# Patient Record
Sex: FEMALE | Race: BLACK OR AFRICAN AMERICAN | NOT HISPANIC OR LATINO | URBAN - METROPOLITAN AREA
[De-identification: names, ages, dates, MRNs, and addresses within clinical notes are randomized per-mention and may not be internally consistent; named-entity substitution may affect disease eponyms.]

---

## 2017-09-13 ENCOUNTER — OUTPATIENT (OUTPATIENT)
Dept: OUTPATIENT SERVICES | Facility: HOSPITAL | Age: 37
LOS: 1 days | End: 2017-09-13

## 2017-09-13 VITALS
WEIGHT: 154.1 LBS | TEMPERATURE: 98 F | RESPIRATION RATE: 14 BRPM | DIASTOLIC BLOOD PRESSURE: 70 MMHG | SYSTOLIC BLOOD PRESSURE: 110 MMHG | HEIGHT: 61 IN | HEART RATE: 78 BPM

## 2017-09-13 DIAGNOSIS — D25.9 LEIOMYOMA OF UTERUS, UNSPECIFIED: ICD-10-CM

## 2017-09-13 LAB
BLD GP AB SCN SERPL QL: NEGATIVE — SIGNIFICANT CHANGE UP
BUN SERPL-MCNC: 11 MG/DL — SIGNIFICANT CHANGE UP (ref 7–23)
CALCIUM SERPL-MCNC: 9.2 MG/DL — SIGNIFICANT CHANGE UP (ref 8.4–10.5)
CHLORIDE SERPL-SCNC: 101 MMOL/L — SIGNIFICANT CHANGE UP (ref 98–107)
CO2 SERPL-SCNC: 26 MMOL/L — SIGNIFICANT CHANGE UP (ref 22–31)
CREAT SERPL-MCNC: 0.85 MG/DL — SIGNIFICANT CHANGE UP (ref 0.5–1.3)
GLUCOSE SERPL-MCNC: 81 MG/DL — SIGNIFICANT CHANGE UP (ref 70–99)
HCT VFR BLD CALC: 37.5 % — SIGNIFICANT CHANGE UP (ref 34.5–45)
HGB BLD-MCNC: 12.5 G/DL — SIGNIFICANT CHANGE UP (ref 11.5–15.5)
MCHC RBC-ENTMCNC: 32.6 PG — SIGNIFICANT CHANGE UP (ref 27–34)
MCHC RBC-ENTMCNC: 33.3 % — SIGNIFICANT CHANGE UP (ref 32–36)
MCV RBC AUTO: 97.7 FL — SIGNIFICANT CHANGE UP (ref 80–100)
NRBC # FLD: 0 — SIGNIFICANT CHANGE UP
PLATELET # BLD AUTO: 250 K/UL — SIGNIFICANT CHANGE UP (ref 150–400)
PMV BLD: 10.4 FL — SIGNIFICANT CHANGE UP (ref 7–13)
POTASSIUM SERPL-MCNC: 3.5 MMOL/L — SIGNIFICANT CHANGE UP (ref 3.5–5.3)
POTASSIUM SERPL-SCNC: 3.5 MMOL/L — SIGNIFICANT CHANGE UP (ref 3.5–5.3)
RBC # BLD: 3.84 M/UL — SIGNIFICANT CHANGE UP (ref 3.8–5.2)
RBC # FLD: 11.9 % — SIGNIFICANT CHANGE UP (ref 10.3–14.5)
RH IG SCN BLD-IMP: POSITIVE — SIGNIFICANT CHANGE UP
SODIUM SERPL-SCNC: 139 MMOL/L — SIGNIFICANT CHANGE UP (ref 135–145)
WBC # BLD: 4.19 K/UL — SIGNIFICANT CHANGE UP (ref 3.8–10.5)
WBC # FLD AUTO: 4.19 K/UL — SIGNIFICANT CHANGE UP (ref 3.8–10.5)

## 2017-09-13 RX ORDER — SODIUM CHLORIDE 9 MG/ML
1000 INJECTION, SOLUTION INTRAVENOUS
Qty: 0 | Refills: 0 | Status: DISCONTINUED | OUTPATIENT
Start: 2017-09-28 | End: 2017-09-29

## 2017-09-13 NOTE — H&P PST ADULT - ATTENDING COMMENTS
38yo with fibroid uterus for abdominal myomectomy. 36yo with fibroid uterus for abdominal myomectomy.  All risks of procedure (hemorrhage, shock, DVT, PE, hysterectomy, damage/injury to nearby structures or organs, and transfusion of blood and blood products) discussed.  Need for  delivery with future pregnancies and recommendation to wait 18months after procedure before attempting to become pregnant also discussed.  Pt also informed of possibility of infertility even after myomectomy.  Pt verbalized understanding and noted that she would like to have procedure.  Consents signed and witnessed.

## 2017-09-13 NOTE — H&P PST ADULT - HISTORY OF PRESENT ILLNESS
36 y/o female with history of uterine fibroids 38 y/o female with history of uterine fibroids presents to PAST today for presurgical evaluation.  She is scheduled for abdominal myomectomy on 9/28/17.

## 2017-09-13 NOTE — H&P PST ADULT - PROBLEM SELECTOR PLAN 1
Pt is scheduled for abdominal myomectomy on 9/28/17.  Preoperative instructions reviewed, pt verbalized understanding.  Preop Famotidine and Chlorhexidine provided.  Lab results pending

## 2017-09-28 ENCOUNTER — INPATIENT (INPATIENT)
Facility: HOSPITAL | Age: 37
LOS: 2 days | Discharge: HOME | End: 2017-10-01
Attending: OBSTETRICS & GYNECOLOGY | Admitting: OBSTETRICS & GYNECOLOGY
Payer: MEDICAID

## 2017-09-28 VITALS
SYSTOLIC BLOOD PRESSURE: 105 MMHG | HEIGHT: 61 IN | OXYGEN SATURATION: 98 % | TEMPERATURE: 99 F | HEART RATE: 89 BPM | WEIGHT: 154.1 LBS | DIASTOLIC BLOOD PRESSURE: 58 MMHG | RESPIRATION RATE: 16 BRPM

## 2017-09-28 DIAGNOSIS — D25.9 LEIOMYOMA OF UTERUS, UNSPECIFIED: ICD-10-CM

## 2017-09-28 DIAGNOSIS — Z98.890 OTHER SPECIFIED POSTPROCEDURAL STATES: ICD-10-CM

## 2017-09-28 LAB
HCG UR QL: NEGATIVE — SIGNIFICANT CHANGE UP
RH IG SCN BLD-IMP: POSITIVE — SIGNIFICANT CHANGE UP

## 2017-09-28 PROCEDURE — 88305 TISSUE EXAM BY PATHOLOGIST: CPT | Mod: 26

## 2017-09-28 RX ORDER — ONDANSETRON 8 MG/1
4 TABLET, FILM COATED ORAL EVERY 6 HOURS
Qty: 0 | Refills: 0 | Status: DISCONTINUED | OUTPATIENT
Start: 2017-09-28 | End: 2017-10-01

## 2017-09-28 RX ORDER — SIMETHICONE 80 MG/1
80 TABLET, CHEWABLE ORAL THREE TIMES A DAY
Qty: 0 | Refills: 0 | Status: DISCONTINUED | OUTPATIENT
Start: 2017-09-28 | End: 2017-10-01

## 2017-09-28 RX ORDER — SODIUM CHLORIDE 9 MG/ML
1000 INJECTION, SOLUTION INTRAVENOUS
Qty: 0 | Refills: 0 | Status: DISCONTINUED | OUTPATIENT
Start: 2017-09-28 | End: 2017-09-29

## 2017-09-28 RX ORDER — HYDROMORPHONE HYDROCHLORIDE 2 MG/ML
0.5 INJECTION INTRAMUSCULAR; INTRAVENOUS; SUBCUTANEOUS
Qty: 0 | Refills: 0 | Status: DISCONTINUED | OUTPATIENT
Start: 2017-09-28 | End: 2017-10-01

## 2017-09-28 RX ORDER — DOCUSATE SODIUM 100 MG
100 CAPSULE ORAL THREE TIMES A DAY
Qty: 0 | Refills: 0 | Status: DISCONTINUED | OUTPATIENT
Start: 2017-09-28 | End: 2017-10-01

## 2017-09-28 RX ORDER — ONDANSETRON 8 MG/1
4 TABLET, FILM COATED ORAL ONCE
Qty: 0 | Refills: 0 | Status: DISCONTINUED | OUTPATIENT
Start: 2017-09-28 | End: 2017-10-01

## 2017-09-28 RX ORDER — HYDROMORPHONE HYDROCHLORIDE 2 MG/ML
1 INJECTION INTRAMUSCULAR; INTRAVENOUS; SUBCUTANEOUS
Qty: 0 | Refills: 0 | Status: DISCONTINUED | OUTPATIENT
Start: 2017-09-28 | End: 2017-10-01

## 2017-09-28 RX ORDER — SODIUM CHLORIDE 9 MG/ML
1000 INJECTION, SOLUTION INTRAVENOUS ONCE
Qty: 0 | Refills: 0 | Status: COMPLETED | OUTPATIENT
Start: 2017-09-28 | End: 2017-09-28

## 2017-09-28 RX ORDER — NALOXONE HYDROCHLORIDE 4 MG/.1ML
0.1 SPRAY NASAL
Qty: 0 | Refills: 0 | Status: DISCONTINUED | OUTPATIENT
Start: 2017-09-28 | End: 2017-10-01

## 2017-09-28 RX ORDER — ACETAMINOPHEN 500 MG
1000 TABLET ORAL ONCE
Qty: 0 | Refills: 0 | Status: COMPLETED | OUTPATIENT
Start: 2017-09-28 | End: 2017-09-29

## 2017-09-28 RX ORDER — HEPARIN SODIUM 5000 [USP'U]/ML
5000 INJECTION INTRAVENOUS; SUBCUTANEOUS EVERY 12 HOURS
Qty: 0 | Refills: 0 | Status: DISCONTINUED | OUTPATIENT
Start: 2017-09-29 | End: 2017-10-01

## 2017-09-28 RX ADMIN — SODIUM CHLORIDE 2000 MILLILITER(S): 9 INJECTION, SOLUTION INTRAVENOUS at 21:55

## 2017-09-28 RX ADMIN — SODIUM CHLORIDE 125 MILLILITER(S): 9 INJECTION, SOLUTION INTRAVENOUS at 21:20

## 2017-09-28 RX ADMIN — SODIUM CHLORIDE 30 MILLILITER(S): 9 INJECTION, SOLUTION INTRAVENOUS at 16:00

## 2017-09-28 NOTE — BRIEF OPERATIVE NOTE - PROCEDURE
Lysis of adhesions  09/28/2017    Active  TSPIEGEL  Myomectomy  09/28/2017    Active  TSPIEGEL <<-----Click on this checkbox to enter Procedure

## 2017-09-28 NOTE — PROGRESS NOTE ADULT - SUBJECTIVE AND OBJECTIVE BOX
R2 OBGYN POST-OP CHECK    S: Patient seen and evaluated at bedside.  Pt awake and alert resting comfortaby in bed   Patient reports pain controlled with analgesia. Pt denies N/V, SOB, CP, palpitations, fever/chills. Has not had anyting to eat but is desiring food.  Not OOB yet.    O:   T(C): 36.7 (09-28-17 @ 23:15), Max: 36.7 (09-28-17 @ 23:15)  HR: 83 (09-28-17 @ 23:15) (64 - 83)  BP: 113/67 (09-28-17 @ 23:15) (107/65 - 117/65)  RR: 20 (09-28-17 @ 23:15) (10 - 20)  SpO2: 96% (09-28-17 @ 23:15) (93% - 100%)  Wt(kg): --  I&O's Summary    28 Sep 2017 07:01  -  28 Sep 2017 23:47  --------------------------------------------------------  IN: 1275 mL / OUT: 65 mL / NET: 1210 mL        Gen: Resting comftorably in bed, NAD  CV: S1S2, RRR  Lungs: CTA B/L  Abd: soft, appropriately tender, occassional BS x 4 quadrants.    Inc: Clean/dry/intact w/ bandage in place  Ext: SCD's in place and functional, non-tender b/l, no edema

## 2017-09-29 LAB
HCT VFR BLD CALC: 29.9 % — LOW (ref 34.5–45)
HGB BLD-MCNC: 10.3 G/DL — LOW (ref 11.5–15.5)
MCHC RBC-ENTMCNC: 32.6 PG — SIGNIFICANT CHANGE UP (ref 27–34)
MCHC RBC-ENTMCNC: 34.4 % — SIGNIFICANT CHANGE UP (ref 32–36)
MCV RBC AUTO: 94.6 FL — SIGNIFICANT CHANGE UP (ref 80–100)
NRBC # FLD: 0 — SIGNIFICANT CHANGE UP
PLATELET # BLD AUTO: 186 K/UL — SIGNIFICANT CHANGE UP (ref 150–400)
PMV BLD: 9.4 FL — SIGNIFICANT CHANGE UP (ref 7–13)
RBC # BLD: 3.16 M/UL — LOW (ref 3.8–5.2)
RBC # FLD: 11.6 % — SIGNIFICANT CHANGE UP (ref 10.3–14.5)
WBC # BLD: 12.53 K/UL — HIGH (ref 3.8–10.5)
WBC # FLD AUTO: 12.53 K/UL — HIGH (ref 3.8–10.5)

## 2017-09-29 RX ORDER — ACETAMINOPHEN 500 MG
650 TABLET ORAL EVERY 6 HOURS
Qty: 0 | Refills: 0 | Status: DISCONTINUED | OUTPATIENT
Start: 2017-09-29 | End: 2017-10-01

## 2017-09-29 RX ORDER — OXYCODONE HYDROCHLORIDE 5 MG/1
5 TABLET ORAL EVERY 4 HOURS
Qty: 0 | Refills: 0 | Status: DISCONTINUED | OUTPATIENT
Start: 2017-09-29 | End: 2017-10-01

## 2017-09-29 RX ORDER — SODIUM CHLORIDE 9 MG/ML
3 INJECTION INTRAMUSCULAR; INTRAVENOUS; SUBCUTANEOUS EVERY 8 HOURS
Qty: 0 | Refills: 0 | Status: DISCONTINUED | OUTPATIENT
Start: 2017-09-29 | End: 2017-10-01

## 2017-09-29 RX ORDER — IBUPROFEN 200 MG
600 TABLET ORAL EVERY 6 HOURS
Qty: 0 | Refills: 0 | Status: DISCONTINUED | OUTPATIENT
Start: 2017-09-29 | End: 2017-10-01

## 2017-09-29 RX ORDER — INFLUENZA VIRUS VACCINE 15; 15; 15; 15 UG/.5ML; UG/.5ML; UG/.5ML; UG/.5ML
0.5 SUSPENSION INTRAMUSCULAR ONCE
Qty: 0 | Refills: 0 | Status: DISCONTINUED | OUTPATIENT
Start: 2017-09-29 | End: 2017-10-01

## 2017-09-29 RX ADMIN — HEPARIN SODIUM 5000 UNIT(S): 5000 INJECTION INTRAVENOUS; SUBCUTANEOUS at 04:59

## 2017-09-29 RX ADMIN — Medication 1000 MILLIGRAM(S): at 02:58

## 2017-09-29 RX ADMIN — SODIUM CHLORIDE 3 MILLILITER(S): 9 INJECTION INTRAMUSCULAR; INTRAVENOUS; SUBCUTANEOUS at 21:14

## 2017-09-29 RX ADMIN — SODIUM CHLORIDE 3 MILLILITER(S): 9 INJECTION INTRAMUSCULAR; INTRAVENOUS; SUBCUTANEOUS at 14:18

## 2017-09-29 RX ADMIN — OXYCODONE HYDROCHLORIDE 5 MILLIGRAM(S): 5 TABLET ORAL at 13:11

## 2017-09-29 RX ADMIN — OXYCODONE HYDROCHLORIDE 5 MILLIGRAM(S): 5 TABLET ORAL at 20:52

## 2017-09-29 RX ADMIN — Medication 600 MILLIGRAM(S): at 18:45

## 2017-09-29 RX ADMIN — Medication 400 MILLIGRAM(S): at 02:21

## 2017-09-29 RX ADMIN — SODIUM CHLORIDE 125 MILLILITER(S): 9 INJECTION, SOLUTION INTRAVENOUS at 08:49

## 2017-09-29 RX ADMIN — Medication 100 MILLIGRAM(S): at 17:03

## 2017-09-29 RX ADMIN — HYDROMORPHONE HYDROCHLORIDE 0.5 MILLIGRAM(S): 2 INJECTION INTRAMUSCULAR; INTRAVENOUS; SUBCUTANEOUS at 05:30

## 2017-09-29 RX ADMIN — SODIUM CHLORIDE 125 MILLILITER(S): 9 INJECTION, SOLUTION INTRAVENOUS at 02:03

## 2017-09-29 RX ADMIN — Medication 600 MILLIGRAM(S): at 17:03

## 2017-09-29 RX ADMIN — OXYCODONE HYDROCHLORIDE 5 MILLIGRAM(S): 5 TABLET ORAL at 14:00

## 2017-09-29 RX ADMIN — Medication 100 MILLIGRAM(S): at 23:24

## 2017-09-29 RX ADMIN — HYDROMORPHONE HYDROCHLORIDE 0.5 MILLIGRAM(S): 2 INJECTION INTRAMUSCULAR; INTRAVENOUS; SUBCUTANEOUS at 05:07

## 2017-09-29 RX ADMIN — Medication 100 MILLIGRAM(S): at 08:49

## 2017-09-29 RX ADMIN — HEPARIN SODIUM 5000 UNIT(S): 5000 INJECTION INTRAVENOUS; SUBCUTANEOUS at 17:03

## 2017-09-29 RX ADMIN — OXYCODONE HYDROCHLORIDE 5 MILLIGRAM(S): 5 TABLET ORAL at 21:50

## 2017-09-29 NOTE — DISCHARGE NOTE ADULT - CONDITIONS AT DISCHARGE
Pt ambulating and voiding without difficulty. Pt is A+OX3. Pt's VSS. Pt received oral pain meds with relief. Pt tolerates diet. Pt verbalized having a had BM on Oct. 1, 2017.

## 2017-09-29 NOTE — DISCHARGE NOTE ADULT - PATIENT PORTAL LINK FT
“You can access the [employer] Patient Portal, offered by [facility], by registering with the following website: [email or web address]”

## 2017-09-29 NOTE — DISCHARGE NOTE ADULT - INSTRUCTIONS
Advance as tolerated Notify MD if experiencing fever, nausea, vomiting, increased pain , bleeding at incision site, increased swelling or redness at incision site.

## 2017-09-29 NOTE — DISCHARGE NOTE ADULT - CARE PLAN
Principal Discharge DX:	Uterine leiomyoma, unspecified location  Goal:	Continuation of activities of daily living  Instructions for follow-up, activity and diet:	Advance diet as tolerated. No heavy lifting, no driving, no strenuous activity for 6-8 weeks. Nothing per vagina for 2 weeks or until cleared by GYN.

## 2017-09-29 NOTE — DISCHARGE NOTE ADULT - MEDICATION SUMMARY - MEDICATIONS TO TAKE
I will START or STAY ON the medications listed below when I get home from the hospital:    oxyCODONE 5 mg oral tablet  -- 1 tab(s) by mouth every 4 hours, As needed, Severe Pain (7 - 10) MDD:4  -- Indication: For Pain    ibuprofen 600 mg oral tablet  -- 1 tab(s) by mouth every 6 hours, As needed, Moderate Pain  -- Indication: For Pain    acetaminophen 325 mg oral tablet  -- 2 tab(s) by mouth every 6 hours, As needed, Mild Pain (1 - 3)  -- Indication: For Pain    acetaminophen 650 mg oral tablet, extended release  -- 1 tab(s) by mouth every 8 hours, As Needed   -- This product contains acetaminophen.  Do not use  with any other product containing acetaminophen to prevent possible liver damage.    -- Indication: For Pain     mg oral tablet  -- 1 tab(s) by mouth every 6 hours, As Needed   -- Do not take this drug if you are pregnant.  It is very important that you take or use this exactly as directed.  Do not skip doses or discontinue unless directed by your doctor.  May cause drowsiness or dizziness.  Obtain medical advice before taking any non-prescription drugs as some may affect the action of this medication.  Take with food or milk.    -- Indication: For Pain    Luden's Honey Lemon Throat Drops 1 mg mucous membrane lozenge  -- 2 lozenge mucous membrane every 2 hours, As Needed   -- Check with your doctor before becoming pregnant.  Do not chew, break, or crush.    -- Indication: For Pain    ferrous sulfate 325 mg (65 mg elemental iron) oral tablet  -- 1 tab(s) by mouth once a day x 30 days   -- Indication: For anemia    docusate sodium 100 mg oral capsule  -- 1 cap(s) by mouth 3 times a day  -- Indication: For constipation    simethicone 80 mg oral tablet, chewable  -- 1 tab(s) by mouth 3 times a day, As needed, Gas  -- Indication: For gas pain I will START or STAY ON the medications listed below when I get home from the hospital:    oxyCODONE 5 mg oral tablet  -- 1 tab(s) by mouth every 4 hours, As needed, Severe Pain (7 - 10) MDD:4  -- Indication: For Pain    ibuprofen 600 mg oral tablet  -- 1 tab(s) by mouth every 6 hours, As needed, Moderate Pain  -- Indication: For Pain    acetaminophen 325 mg oral tablet  -- 2 tab(s) by mouth every 6 hours, As needed, Mild Pain (1 - 3)  -- Indication: For Pain    acetaminophen 650 mg oral tablet, extended release  -- 1 tab(s) by mouth every 8 hours, As Needed   -- This product contains acetaminophen.  Do not use  with any other product containing acetaminophen to prevent possible liver damage.    -- Indication: For Pain     mg oral tablet  -- 1 tab(s) by mouth every 6 hours, As Needed   -- Do not take this drug if you are pregnant.  It is very important that you take or use this exactly as directed.  Do not skip doses or discontinue unless directed by your doctor.  May cause drowsiness or dizziness.  Obtain medical advice before taking any non-prescription drugs as some may affect the action of this medication.  Take with food or milk.    -- Indication: For Pain    Luden's Honey Lemon Throat Drops 1 mg mucous membrane lozenge  -- 2 lozenge mucous membrane every 2 hours, As Needed   -- Check with your doctor before becoming pregnant.  Do not chew, break, or crush.    -- Indication: For Pain    ferrous sulfate 325 mg (65 mg elemental iron) oral tablet  -- 1 tab(s) by mouth once a day x 30 days   -- Indication: For anemia    docusate sodium 100 mg oral capsule  -- 1 cap(s) by mouth 3 times a day  -- Indication: For constipation    bisacodyl 10 mg rectal suppository  -- 1 suppository(ies) rectally once a day, As Needed  for constipation   -- For rectal use only.  Keep in refrigerator.  Do not freeze.    -- Indication: For constipation    simethicone 80 mg oral tablet, chewable  -- 1 tab(s) by mouth 3 times a day, As needed, Gas  -- Indication: For gas pain

## 2017-09-29 NOTE — PROGRESS NOTE ADULT - SUBJECTIVE AND OBJECTIVE BOX
INTERVAL HPI/OVERNIGHT EVENTS: Pt seen and examined at bedside. No issues overnight.  Has not ambulated or passed gas, pain controlled with analgesia, andrea catheter in place  SHe denies nausea/vomiting/fever/chills/chest pain/SOB/dizziness.    MEDICATIONS  (STANDING):  lactated ringers. 1000 milliLiter(s) (30 mL/Hr) IV Continuous <Continuous>  lactated ringers. 1000 milliLiter(s) (125 mL/Hr) IV Continuous <Continuous>  docusate sodium 100 milliGRAM(s) Oral three times a day  lactated ringers. 1000 milliLiter(s) (125 mL/Hr) IV Continuous <Continuous>  heparin  Injectable 5000 Unit(s) SubCutaneous every 12 hours  influenza   Vaccine 0.5 milliLiter(s) IntraMuscular once    MEDICATIONS  (PRN):  simethicone 80 milliGRAM(s) Chew three times a day PRN Gas  HYDROmorphone  Injectable 1 milliGRAM(s) IV Push every 10 minutes PRN Severe Pain (7 - 10)  HYDROmorphone  Injectable 0.5 milliGRAM(s) IV Push every 10 minutes PRN Moderate Pain (4 - 6)  ondansetron Injectable 4 milliGRAM(s) IV Push once PRN Nausea and/or Vomiting  promethazine IVPB 6.25 milliGRAM(s) IV Intermittent once PRN Nausea and/or Vomiting  naloxone Injectable 0.1 milliGRAM(s) IV Push every 3 minutes PRN For ANY of the following changes in patient status:  A. RR LESS THAN 10 breaths per minute, B. Oxygen saturation LESS THAN 90%, C. Sedation score of 6  ondansetron Injectable 4 milliGRAM(s) IV Push every 6 hours PRN Nausea      12 point ROS negative except as outlined above    Vital Signs Last 24 Hrs  T(C): 36.7 (29 Sep 2017 04:57), Max: 37.1 (28 Sep 2017 15:38)  T(F): 98 (29 Sep 2017 04:57), Max: 98.8 (28 Sep 2017 15:38)  HR: 72 (29 Sep 2017 05:20) (64 - 89)  BP: 107/64 (29 Sep 2017 05:20) (105/58 - 117/65)  BP(mean): --  RR: 15 (29 Sep 2017 05:20) (10 - 20)  SpO2: 96% (29 Sep 2017 05:20) (93% - 100%)    I&O's Summary    28 Sep 2017 07:01  -  29 Sep 2017 06:54  --------------------------------------------------------  IN: 2025 mL / OUT: 290 mL / NET: 1735 mL          PHYSICAL EXAM:    GA: NAD, A+0 x 3  CV: RRR  Pulm: CTA BL  Abd: ( + ) BS, soft, nontender, nondistended, no rebound or guarding,   Incision: clean, dry and intact; steri-strips in place  Extremities: NTBL        LABS:                          10.3   12.53 )-----------( 186      ( 29 Sep 2017 04:50 )             29.9   baso x      eos x      imm gran x      lymph x      mono x      poly x                      RADIOLOGY & ADDITIONAL TESTS:

## 2017-09-29 NOTE — DISCHARGE NOTE ADULT - PLAN OF CARE
Continuation of activities of daily living Advance diet as tolerated. No heavy lifting, no driving, no strenuous activity for 6-8 weeks. Nothing per vagina for 2 weeks or until cleared by GYN.

## 2017-09-29 NOTE — DISCHARGE NOTE ADULT - CARE PROVIDER_API CALL
Lorraine Reyes), Obstetrics  Gynecology  [address] [place] [address]  Phone: [phone]  Fax: [phone]

## 2017-09-30 LAB
BUN SERPL-MCNC: 9 MG/DL — SIGNIFICANT CHANGE UP (ref 7–23)
CALCIUM SERPL-MCNC: 8 MG/DL — LOW (ref 8.4–10.5)
CHLORIDE SERPL-SCNC: 103 MMOL/L — SIGNIFICANT CHANGE UP (ref 98–107)
CO2 SERPL-SCNC: 27 MMOL/L — SIGNIFICANT CHANGE UP (ref 22–31)
CREAT SERPL-MCNC: 0.7 MG/DL — SIGNIFICANT CHANGE UP (ref 0.5–1.3)
GLUCOSE SERPL-MCNC: 98 MG/DL — SIGNIFICANT CHANGE UP (ref 70–99)
HCT VFR BLD CALC: 27.7 % — LOW (ref 34.5–45)
HGB BLD-MCNC: 9.3 G/DL — LOW (ref 11.5–15.5)
MCHC RBC-ENTMCNC: 32.4 PG — SIGNIFICANT CHANGE UP (ref 27–34)
MCHC RBC-ENTMCNC: 33.6 % — SIGNIFICANT CHANGE UP (ref 32–36)
MCV RBC AUTO: 96.5 FL — SIGNIFICANT CHANGE UP (ref 80–100)
NRBC # FLD: 0 — SIGNIFICANT CHANGE UP
PLATELET # BLD AUTO: 176 K/UL — SIGNIFICANT CHANGE UP (ref 150–400)
PMV BLD: 9.9 FL — SIGNIFICANT CHANGE UP (ref 7–13)
POTASSIUM SERPL-MCNC: 4.1 MMOL/L — SIGNIFICANT CHANGE UP (ref 3.5–5.3)
POTASSIUM SERPL-SCNC: 4.1 MMOL/L — SIGNIFICANT CHANGE UP (ref 3.5–5.3)
RBC # BLD: 2.87 M/UL — LOW (ref 3.8–5.2)
RBC # FLD: 12.1 % — SIGNIFICANT CHANGE UP (ref 10.3–14.5)
SODIUM SERPL-SCNC: 141 MMOL/L — SIGNIFICANT CHANGE UP (ref 135–145)
WBC # BLD: 8.02 K/UL — SIGNIFICANT CHANGE UP (ref 3.8–10.5)
WBC # FLD AUTO: 8.02 K/UL — SIGNIFICANT CHANGE UP (ref 3.8–10.5)

## 2017-09-30 RX ORDER — BENZOCAINE AND MENTHOL 5; 1 G/100ML; G/100ML
1 LIQUID ORAL
Qty: 0 | Refills: 0 | Status: DISCONTINUED | OUTPATIENT
Start: 2017-09-30 | End: 2017-10-01

## 2017-09-30 RX ORDER — FERROUS SULFATE 325(65) MG
325 TABLET ORAL
Qty: 0 | Refills: 0 | Status: DISCONTINUED | OUTPATIENT
Start: 2017-09-30 | End: 2017-10-01

## 2017-09-30 RX ADMIN — Medication 600 MILLIGRAM(S): at 09:30

## 2017-09-30 RX ADMIN — SODIUM CHLORIDE 3 MILLILITER(S): 9 INJECTION INTRAMUSCULAR; INTRAVENOUS; SUBCUTANEOUS at 21:21

## 2017-09-30 RX ADMIN — OXYCODONE HYDROCHLORIDE 5 MILLIGRAM(S): 5 TABLET ORAL at 20:17

## 2017-09-30 RX ADMIN — OXYCODONE HYDROCHLORIDE 5 MILLIGRAM(S): 5 TABLET ORAL at 21:15

## 2017-09-30 RX ADMIN — OXYCODONE HYDROCHLORIDE 5 MILLIGRAM(S): 5 TABLET ORAL at 16:36

## 2017-09-30 RX ADMIN — BENZOCAINE AND MENTHOL 1 LOZENGE: 5; 1 LIQUID ORAL at 08:47

## 2017-09-30 RX ADMIN — BENZOCAINE AND MENTHOL 1 LOZENGE: 5; 1 LIQUID ORAL at 12:23

## 2017-09-30 RX ADMIN — OXYCODONE HYDROCHLORIDE 5 MILLIGRAM(S): 5 TABLET ORAL at 01:41

## 2017-09-30 RX ADMIN — Medication 325 MILLIGRAM(S): at 08:46

## 2017-09-30 RX ADMIN — OXYCODONE HYDROCHLORIDE 5 MILLIGRAM(S): 5 TABLET ORAL at 02:30

## 2017-09-30 RX ADMIN — Medication 100 MILLIGRAM(S): at 08:47

## 2017-09-30 RX ADMIN — SODIUM CHLORIDE 3 MILLILITER(S): 9 INJECTION INTRAMUSCULAR; INTRAVENOUS; SUBCUTANEOUS at 13:16

## 2017-09-30 RX ADMIN — SIMETHICONE 80 MILLIGRAM(S): 80 TABLET, CHEWABLE ORAL at 01:47

## 2017-09-30 RX ADMIN — OXYCODONE HYDROCHLORIDE 5 MILLIGRAM(S): 5 TABLET ORAL at 07:00

## 2017-09-30 RX ADMIN — SODIUM CHLORIDE 3 MILLILITER(S): 9 INJECTION INTRAMUSCULAR; INTRAVENOUS; SUBCUTANEOUS at 06:07

## 2017-09-30 RX ADMIN — Medication 100 MILLIGRAM(S): at 16:08

## 2017-09-30 RX ADMIN — HEPARIN SODIUM 5000 UNIT(S): 5000 INJECTION INTRAVENOUS; SUBCUTANEOUS at 16:08

## 2017-09-30 RX ADMIN — Medication 600 MILLIGRAM(S): at 23:59

## 2017-09-30 RX ADMIN — Medication 325 MILLIGRAM(S): at 20:02

## 2017-09-30 RX ADMIN — HEPARIN SODIUM 5000 UNIT(S): 5000 INJECTION INTRAVENOUS; SUBCUTANEOUS at 06:04

## 2017-09-30 RX ADMIN — OXYCODONE HYDROCHLORIDE 5 MILLIGRAM(S): 5 TABLET ORAL at 06:03

## 2017-09-30 RX ADMIN — Medication 600 MILLIGRAM(S): at 08:46

## 2017-09-30 RX ADMIN — BENZOCAINE AND MENTHOL 1 LOZENGE: 5; 1 LIQUID ORAL at 20:01

## 2017-09-30 RX ADMIN — SIMETHICONE 80 MILLIGRAM(S): 80 TABLET, CHEWABLE ORAL at 12:27

## 2017-09-30 RX ADMIN — Medication 100 MILLIGRAM(S): at 23:07

## 2017-09-30 RX ADMIN — OXYCODONE HYDROCHLORIDE 5 MILLIGRAM(S): 5 TABLET ORAL at 16:08

## 2017-09-30 RX ADMIN — Medication 600 MILLIGRAM(S): at 23:08

## 2017-09-30 RX ADMIN — BENZOCAINE AND MENTHOL 1 LOZENGE: 5; 1 LIQUID ORAL at 16:08

## 2017-09-30 RX ADMIN — SIMETHICONE 80 MILLIGRAM(S): 80 TABLET, CHEWABLE ORAL at 06:07

## 2017-09-30 NOTE — PROGRESS NOTE ADULT - SUBJECTIVE AND OBJECTIVE BOX
POD# 2  HD#3    Patient seen and examined at bedside. No acute overnight events. No acute complaints, pain partially controlled with oral pain medications.  Patient is ambulating. She is not yet passing flatus. Voiding spontaneously and tolerating regular diet. Denies CP, SOB, N/V, fevers, and chills. Patient reports numbness on the  lateral aspect of her right thigh. Denies leg pain, numbness or tingling in her feet. Patient is able to stand and bear weight in R leg.      Vital Signs Last 24 Hours  T(C): 37.1 (09-30-17 @ 06:17), Max: 37.1 (09-29-17 @ 18:12)  HR: 89 (09-30-17 @ 06:17) (72 - 98)  BP: 107/63 (09-30-17 @ 06:17) (98/54 - 107/63)  RR: 17 (09-30-17 @ 06:17) (16 - 17)  SpO2: 100% (09-30-17 @ 06:17) (96% - 100%)    I&O's Summary    28 Sep 2017 07:01  -  29 Sep 2017 07:00  --------------------------------------------------------  IN: 2525 mL / OUT: 1540 mL / NET: 985 mL    29 Sep 2017 07:01  -  30 Sep 2017 06:53  --------------------------------------------------------  IN: 0 mL / OUT: 2450 mL / NET: -2450 mL        Physical Exam:  General: NAD  CV: RRR  Lungs: CTAB  Abdomen: Soft, non-tender, non-distended, +BS  Incision: C/D/I  Ext: No pain or swelling, No skin changes. Normal sensation in left leg and medial aspect of right thigh. Normal ROM of bilateral legs.     Labs:                        9.3    8.02  )-----------( 176      ( 30 Sep 2017 05:03 )             27.7   baso x      eos x      imm gran x      lymph x      mono x      poly x                            10.3   12.53 )-----------( 186      ( 29 Sep 2017 04:50 )             29.9   baso x      eos x      imm gran x      lymph x      mono x      poly x          MEDICATIONS  (STANDING):  docusate sodium 100 milliGRAM(s) Oral three times a day  heparin  Injectable 5000 Unit(s) SubCutaneous every 12 hours  influenza   Vaccine 0.5 milliLiter(s) IntraMuscular once  sodium chloride 0.9% lock flush 3 milliLiter(s) IV Push every 8 hours  sodium chloride 0.9% lock flush 3 milliLiter(s) IV Push every 8 hours  ferrous    sulfate 325 milliGRAM(s) Oral two times a day with meals    MEDICATIONS  (PRN):  simethicone 80 milliGRAM(s) Chew three times a day PRN Gas  HYDROmorphone  Injectable 1 milliGRAM(s) IV Push every 10 minutes PRN Severe Pain (7 - 10)  HYDROmorphone  Injectable 0.5 milliGRAM(s) IV Push every 10 minutes PRN Moderate Pain (4 - 6)  ondansetron Injectable 4 milliGRAM(s) IV Push once PRN Nausea and/or Vomiting  promethazine IVPB 6.25 milliGRAM(s) IV Intermittent once PRN Nausea and/or Vomiting  naloxone Injectable 0.1 milliGRAM(s) IV Push every 3 minutes PRN For ANY of the following changes in patient status:  A. RR LESS THAN 10 breaths per minute, B. Oxygen saturation LESS THAN 90%, C. Sedation score of 6  ondansetron Injectable 4 milliGRAM(s) IV Push every 6 hours PRN Nausea  ibuprofen  Tablet 600 milliGRAM(s) Oral every 6 hours PRN Moderate Pain  acetaminophen   Tablet. 650 milliGRAM(s) Oral every 6 hours PRN Mild Pain (1 - 3)  oxyCODONE    IR 5 milliGRAM(s) Oral every 4 hours PRN Severe Pain (7 - 10)  benzocaine 15 mG/menthol 3.6 mG Lozenge 1 Lozenge Oral every 2 hours PRN Sore Throat

## 2017-10-01 VITALS
DIASTOLIC BLOOD PRESSURE: 52 MMHG | HEART RATE: 79 BPM | TEMPERATURE: 99 F | RESPIRATION RATE: 18 BRPM | SYSTOLIC BLOOD PRESSURE: 94 MMHG | OXYGEN SATURATION: 97 %

## 2017-10-01 RX ORDER — FERROUS SULFATE 325(65) MG
1 TABLET ORAL
Qty: 14 | Refills: 0 | OUTPATIENT
Start: 2017-10-01 | End: 2017-10-31

## 2017-10-01 RX ORDER — SIMETHICONE 80 MG/1
1 TABLET, CHEWABLE ORAL
Qty: 0 | Refills: 0 | COMMUNITY
Start: 2017-10-01

## 2017-10-01 RX ORDER — DOCUSATE SODIUM 100 MG
1 CAPSULE ORAL
Qty: 0 | Refills: 0 | COMMUNITY
Start: 2017-10-01

## 2017-10-01 RX ORDER — IBUPROFEN 200 MG
1 TABLET ORAL
Qty: 0 | Refills: 0 | COMMUNITY
Start: 2017-10-01

## 2017-10-01 RX ORDER — ACETAMINOPHEN 500 MG
2 TABLET ORAL
Qty: 0 | Refills: 0 | COMMUNITY
Start: 2017-10-01

## 2017-10-01 RX ORDER — ACETAMINOPHEN 500 MG
1 TABLET ORAL
Qty: 30 | Refills: 0 | OUTPATIENT
Start: 2017-10-01 | End: 2017-10-04

## 2017-10-01 RX ORDER — IBUPROFEN 200 MG
1 TABLET ORAL
Qty: 30 | Refills: 0 | OUTPATIENT
Start: 2017-10-01

## 2017-10-01 RX ORDER — OXYCODONE HYDROCHLORIDE 5 MG/1
1 TABLET ORAL
Qty: 20 | Refills: 0 | OUTPATIENT
Start: 2017-10-01

## 2017-10-01 RX ADMIN — SODIUM CHLORIDE 3 MILLILITER(S): 9 INJECTION INTRAMUSCULAR; INTRAVENOUS; SUBCUTANEOUS at 05:32

## 2017-10-01 RX ADMIN — SIMETHICONE 80 MILLIGRAM(S): 80 TABLET, CHEWABLE ORAL at 02:32

## 2017-10-01 RX ADMIN — HEPARIN SODIUM 5000 UNIT(S): 5000 INJECTION INTRAVENOUS; SUBCUTANEOUS at 06:16

## 2017-10-01 RX ADMIN — OXYCODONE HYDROCHLORIDE 5 MILLIGRAM(S): 5 TABLET ORAL at 03:12

## 2017-10-01 RX ADMIN — OXYCODONE HYDROCHLORIDE 5 MILLIGRAM(S): 5 TABLET ORAL at 02:32

## 2017-10-01 RX ADMIN — SIMETHICONE 80 MILLIGRAM(S): 80 TABLET, CHEWABLE ORAL at 13:58

## 2017-10-01 RX ADMIN — BENZOCAINE AND MENTHOL 1 LOZENGE: 5; 1 LIQUID ORAL at 08:12

## 2017-10-01 RX ADMIN — Medication 325 MILLIGRAM(S): at 08:12

## 2017-10-01 RX ADMIN — Medication 600 MILLIGRAM(S): at 07:03

## 2017-10-01 RX ADMIN — Medication 600 MILLIGRAM(S): at 06:16

## 2017-10-01 RX ADMIN — Medication 100 MILLIGRAM(S): at 08:12

## 2017-10-01 RX ADMIN — OXYCODONE HYDROCHLORIDE 5 MILLIGRAM(S): 5 TABLET ORAL at 13:58

## 2017-10-01 NOTE — PROGRESS NOTE ADULT - ASSESSMENT
37 yof s/p abdmonimal myomectomy, POD1, stable
38yo F s/p abdominal myomectomy, JUAN, Tap block, doing well post op
A/P: 37y Female s/p abdominal myomectomy, JUAN, Tap Block
36yo F POD 2 s/p abdominal myomectomy

## 2017-10-01 NOTE — PROGRESS NOTE ADULT - PROBLEM SELECTOR PLAN 1
Neuro: Continue po pain meds  CV: Hemodynamically stable  Pulm: Saturating well on room air, encourage oob/amb  GI: Advance as tolerated  : UOP adequate, voiding freely  Heme: c/w HSQ and SCDs for DVT ppx  Dispo: Continue routine post-op care    Jackie Villeda, PGY-2
1. Neuro: PCA for pain  2. CV: Hemodynamically stable.  Monitor VS. CBC in AM.   3. Pulm: Saturating well on room air.  Encourage OOB and incentive spirometer use.   4. GI: Advance to regular diet. Anti-emetics PRN.  5. : Ignacio to gravity.   6. Electrolytes: LR@125cc/hr. BMP in AM.   7. DVT ppx w/ SCD's while in bed. Early ambulation, initially with assistance then as tolerated.  Continue HSQ   8. Discharge from PACU when criteria met.     Isha Griggs PGY-2
Neuro: Continue po pain meds  CV: Hemodynamically stable  Pulm: Saturating well on room air, encourage oob/amb  GI: Advance as tolerated  : UOP adequate, voiding freely  Heme: c/w HSQ and SCDs for DVT ppx  Dispo: Continue routine post-op care    Jackie Villeda, PGY-2
neuro: pain c/w iv analgesia, advance to po when patient tolerates regular diet  CV hemodynamically stable no acute issues  pulm increase is use increase oob  gi reg diet  gu monitor uop, adequate, andrea removed, dtv#3 pm  heme dvt ppx: scds, hsq, am cbc pending  Micha-Peri R3

## 2017-10-01 NOTE — PHYSICAL THERAPY INITIAL EVALUATION ADULT - ADDITIONAL COMMENTS
Pt reports that she lives in a private house with mother with a flight of stairs to negotiate to bedroom. Prior  to hospital admission pt was completely independent and used no assistive device with ambulation. Pt denies any recent falls.    Pt left comfortable in chair, NAD, all lines intact, all precautions maintained, with call bell in reach, and RN aware of PT session.

## 2017-10-01 NOTE — PROGRESS NOTE ADULT - SUBJECTIVE AND OBJECTIVE BOX
POD# 3  HD# 4    Patient seen and examined at bedside. No acute overnight events. No acute complaints, pain well controlled. Patient is ambulating. She is passing flatus. Voiding spontaneously and tolerating regular diet. Denies CP, SOB, N/V, fevers, and chills.    Vital Signs Last 24 Hours  T(C): 36.9 (10-01-17 @ 05:31), Max: 37.2 (09-30-17 @ 14:03)  HR: 71 (10-01-17 @ 05:31) (71 - 89)  BP: 95/56 (10-01-17 @ 05:31) (94/50 - 106/66)  RR: 18 (10-01-17 @ 05:31) (17 - 19)  SpO2: 98% (10-01-17 @ 05:31) (96% - 100%)    I&O's Summary    30 Sep 2017 07:01  -  01 Oct 2017 07:00  --------------------------------------------------------  IN: 0 mL / OUT: 700 mL / NET: -700 mL        Physical Exam:  General: NAD  CV: RRR  Lungs: CTAB  Abdomen: Soft, non-tender, non-distended, +BS  Incision: C/D/I  Ext: No pain or swelling    Labs:                        9.3    8.02  )-----------( 176      ( 30 Sep 2017 05:03 )             27.7   baso x      eos x      imm gran x      lymph x      mono x      poly x                            10.3   12.53 )-----------( 186      ( 29 Sep 2017 04:50 )             29.9   baso x      eos x      imm gran x      lymph x      mono x      poly x          MEDICATIONS  (STANDING):  docusate sodium 100 milliGRAM(s) Oral three times a day  heparin  Injectable 5000 Unit(s) SubCutaneous every 12 hours  influenza   Vaccine 0.5 milliLiter(s) IntraMuscular once  sodium chloride 0.9% lock flush 3 milliLiter(s) IV Push every 8 hours  sodium chloride 0.9% lock flush 3 milliLiter(s) IV Push every 8 hours  ferrous    sulfate 325 milliGRAM(s) Oral two times a day with meals  benzocaine 15 mG/menthol 3.6 mG Lozenge 1 Lozenge Oral five times a day    MEDICATIONS  (PRN):  simethicone 80 milliGRAM(s) Chew three times a day PRN Gas  HYDROmorphone  Injectable 1 milliGRAM(s) IV Push every 10 minutes PRN Severe Pain (7 - 10)  HYDROmorphone  Injectable 0.5 milliGRAM(s) IV Push every 10 minutes PRN Moderate Pain (4 - 6)  ondansetron Injectable 4 milliGRAM(s) IV Push once PRN Nausea and/or Vomiting  promethazine IVPB 6.25 milliGRAM(s) IV Intermittent once PRN Nausea and/or Vomiting  naloxone Injectable 0.1 milliGRAM(s) IV Push every 3 minutes PRN For ANY of the following changes in patient status:  A. RR LESS THAN 10 breaths per minute, B. Oxygen saturation LESS THAN 90%, C. Sedation score of 6  ondansetron Injectable 4 milliGRAM(s) IV Push every 6 hours PRN Nausea  ibuprofen  Tablet 600 milliGRAM(s) Oral every 6 hours PRN Moderate Pain  acetaminophen   Tablet. 650 milliGRAM(s) Oral every 6 hours PRN Mild Pain (1 - 3)  oxyCODONE    IR 5 milliGRAM(s) Oral every 4 hours PRN Severe Pain (7 - 10)  benzocaine 15 mG/menthol 3.6 mG Lozenge 1 Lozenge Oral every 2 hours PRN Sore Throat POD# 3  HD# 4    Patient seen and examined at bedside. No acute overnight events. No acute complaints, pain well controlled. Patient is ambulating. She is passing flatus. Voiding spontaneously and tolerating regular diet. Denies CP, SOB, N/V, fevers, and chills. Patient requesting laxative to have BM before discharge.    Vital Signs Last 24 Hours  T(C): 36.9 (10-01-17 @ 05:31), Max: 37.2 (09-30-17 @ 14:03)  HR: 71 (10-01-17 @ 05:31) (71 - 89)  BP: 95/56 (10-01-17 @ 05:31) (94/50 - 106/66)  RR: 18 (10-01-17 @ 05:31) (17 - 19)  SpO2: 98% (10-01-17 @ 05:31) (96% - 100%)    I&O's Summary    30 Sep 2017 07:01  -  01 Oct 2017 07:00  --------------------------------------------------------  IN: 0 mL / OUT: 700 mL / NET: -700 mL        Physical Exam:  General: NAD  CV: RRR  Lungs: CTAB  Abdomen: Soft, non-tender, non-distended, +BS  Incision: C/D/I  Ext: No pain or swelling    Labs:                        9.3    8.02  )-----------( 176      ( 30 Sep 2017 05:03 )             27.7   baso x      eos x      imm gran x      lymph x      mono x      poly x                            10.3   12.53 )-----------( 186      ( 29 Sep 2017 04:50 )             29.9   baso x      eos x      imm gran x      lymph x      mono x      poly x          MEDICATIONS  (STANDING):  docusate sodium 100 milliGRAM(s) Oral three times a day  heparin  Injectable 5000 Unit(s) SubCutaneous every 12 hours  influenza   Vaccine 0.5 milliLiter(s) IntraMuscular once  sodium chloride 0.9% lock flush 3 milliLiter(s) IV Push every 8 hours  sodium chloride 0.9% lock flush 3 milliLiter(s) IV Push every 8 hours  ferrous    sulfate 325 milliGRAM(s) Oral two times a day with meals  benzocaine 15 mG/menthol 3.6 mG Lozenge 1 Lozenge Oral five times a day    MEDICATIONS  (PRN):  simethicone 80 milliGRAM(s) Chew three times a day PRN Gas  HYDROmorphone  Injectable 1 milliGRAM(s) IV Push every 10 minutes PRN Severe Pain (7 - 10)  HYDROmorphone  Injectable 0.5 milliGRAM(s) IV Push every 10 minutes PRN Moderate Pain (4 - 6)  ondansetron Injectable 4 milliGRAM(s) IV Push once PRN Nausea and/or Vomiting  promethazine IVPB 6.25 milliGRAM(s) IV Intermittent once PRN Nausea and/or Vomiting  naloxone Injectable 0.1 milliGRAM(s) IV Push every 3 minutes PRN For ANY of the following changes in patient status:  A. RR LESS THAN 10 breaths per minute, B. Oxygen saturation LESS THAN 90%, C. Sedation score of 6  ondansetron Injectable 4 milliGRAM(s) IV Push every 6 hours PRN Nausea  ibuprofen  Tablet 600 milliGRAM(s) Oral every 6 hours PRN Moderate Pain  acetaminophen   Tablet. 650 milliGRAM(s) Oral every 6 hours PRN Mild Pain (1 - 3)  oxyCODONE    IR 5 milliGRAM(s) Oral every 4 hours PRN Severe Pain (7 - 10)  benzocaine 15 mG/menthol 3.6 mG Lozenge 1 Lozenge Oral every 2 hours PRN Sore Throat

## 2017-10-01 NOTE — PHYSICAL THERAPY INITIAL EVALUATION ADULT - PERTINENT HX OF CURRENT PROBLEM, REHAB EVAL
36 y/o female with history of uterine fibroids. She is admitted for abdominal myomectomy on 9/28/17.

## 2017-10-01 NOTE — PROGRESS NOTE ADULT - ATTENDING COMMENTS
Pt evaluated and examined at bedside, has no complaints, pain well controlled.  Pt ambulating, voiding, tolerating diet.   Pt counseled regarding operative findings and recovery plan.      VSS  abd: soft, nt, nd no rebound or gaurding, BS+  pelvis: minimal lochia
resident note reviewed, VSS, pt notes paresthesia on lateral aspect of right upper leg, on examination tone and strength equal in both lower extremities and patient notes sensation of touch over area of paresthesia.  Pt ambulating with normal gait without difficulty, tolerating diet, voidings, denies any flatus or BM.  Pt denies any vaginal bleeding.      Abd: soft, nt, nd, bs+, no rebound or guarding, incision c/d/i w/ steristrips present.    Ext: no calf tenderness or edema, lateral aspect of right thigh visualized with no notable lesions, erythema, or contusions.      Continue routine post op care  ambulation encouraged  fluid intake encouraged  pain meds po prn pain   cepacol prn  monitor vitals   PT consult as per patient request after discussion of her normal gait, LE movement/strength.
in to see patient, doing well, +flatus, no bm.  had some nausea after motrin on empty stomach.    vitals stable  incision c/d/i    plan  discharge home today, meds sent to pharmacy  f/u in 2 weeks,   precautions reviewed  all concerns addressed    md tian

## 2017-10-01 NOTE — PHYSICAL THERAPY INITIAL EVALUATION ADULT - PATIENT PROFILE REVIEW, REHAB EVAL
yes/ACTIVITY: Ambulate as Tolerated; spoke with RODNEY Bonilla prior to PT evaluation--> Pt OK for ambulation

## 2019-06-11 ENCOUNTER — EMERGENCY (EMERGENCY)
Facility: HOSPITAL | Age: 39
LOS: 0 days | Discharge: HOME | End: 2019-06-11
Attending: EMERGENCY MEDICINE
Payer: COMMERCIAL

## 2019-06-11 VITALS
DIASTOLIC BLOOD PRESSURE: 70 MMHG | TEMPERATURE: 99 F | RESPIRATION RATE: 19 BRPM | HEART RATE: 76 BPM | OXYGEN SATURATION: 99 % | SYSTOLIC BLOOD PRESSURE: 120 MMHG

## 2019-06-11 VITALS
HEIGHT: 61 IN | DIASTOLIC BLOOD PRESSURE: 79 MMHG | WEIGHT: 179.9 LBS | HEART RATE: 79 BPM | TEMPERATURE: 98 F | RESPIRATION RATE: 16 BRPM | OXYGEN SATURATION: 99 % | SYSTOLIC BLOOD PRESSURE: 127 MMHG

## 2019-06-11 DIAGNOSIS — R53.81 OTHER MALAISE: ICD-10-CM

## 2019-06-11 DIAGNOSIS — R07.9 CHEST PAIN, UNSPECIFIED: ICD-10-CM

## 2019-06-11 DIAGNOSIS — R42 DIZZINESS AND GIDDINESS: ICD-10-CM

## 2019-06-11 LAB
ALBUMIN SERPL ELPH-MCNC: 3.1 G/DL — LOW (ref 3.3–5)
ALP SERPL-CCNC: 33 U/L — LOW (ref 40–120)
ALT FLD-CCNC: 20 U/L — SIGNIFICANT CHANGE UP (ref 12–78)
ANION GAP SERPL CALC-SCNC: 8 MMOL/L — SIGNIFICANT CHANGE UP (ref 5–17)
APPEARANCE UR: CLEAR — SIGNIFICANT CHANGE UP
APTT BLD: 28.3 SEC — SIGNIFICANT CHANGE UP (ref 27.5–36.3)
AST SERPL-CCNC: 10 U/L — LOW (ref 15–37)
BACTERIA # UR AUTO: ABNORMAL
BASOPHILS # BLD AUTO: 0.07 K/UL — SIGNIFICANT CHANGE UP (ref 0–0.2)
BASOPHILS NFR BLD AUTO: 1 % — SIGNIFICANT CHANGE UP (ref 0–2)
BILIRUB SERPL-MCNC: 0.6 MG/DL — SIGNIFICANT CHANGE UP (ref 0.2–1.2)
BILIRUB UR-MCNC: NEGATIVE — SIGNIFICANT CHANGE UP
BUN SERPL-MCNC: 8 MG/DL — SIGNIFICANT CHANGE UP (ref 7–23)
CALCIUM SERPL-MCNC: 8 MG/DL — LOW (ref 8.5–10.1)
CHLORIDE SERPL-SCNC: 106 MMOL/L — SIGNIFICANT CHANGE UP (ref 96–108)
CK MB BLD-MCNC: <2.2 % — SIGNIFICANT CHANGE UP (ref 0–3.5)
CK MB CFR SERPL CALC: <1 NG/ML — SIGNIFICANT CHANGE UP (ref 0.5–3.6)
CK SERPL-CCNC: 46 U/L — SIGNIFICANT CHANGE UP (ref 26–192)
CO2 SERPL-SCNC: 25 MMOL/L — SIGNIFICANT CHANGE UP (ref 22–31)
COLOR SPEC: YELLOW — SIGNIFICANT CHANGE UP
CREAT SERPL-MCNC: 0.64 MG/DL — SIGNIFICANT CHANGE UP (ref 0.5–1.3)
D DIMER BLD IA.RAPID-MCNC: 187 NG/ML DDU — SIGNIFICANT CHANGE UP
DIFF PNL FLD: ABNORMAL
EOSINOPHIL # BLD AUTO: 0.08 K/UL — SIGNIFICANT CHANGE UP (ref 0–0.5)
EOSINOPHIL NFR BLD AUTO: 1.1 % — SIGNIFICANT CHANGE UP (ref 0–6)
EPI CELLS # UR: SIGNIFICANT CHANGE UP
GLUCOSE SERPL-MCNC: 88 MG/DL — SIGNIFICANT CHANGE UP (ref 70–99)
GLUCOSE UR QL: NEGATIVE MG/DL — SIGNIFICANT CHANGE UP
HCT VFR BLD CALC: 34.8 % — SIGNIFICANT CHANGE UP (ref 34.5–45)
HGB BLD-MCNC: 11.9 G/DL — SIGNIFICANT CHANGE UP (ref 11.5–15.5)
IMM GRANULOCYTES NFR BLD AUTO: 1 % — SIGNIFICANT CHANGE UP (ref 0–1.5)
INR BLD: 1.16 RATIO — SIGNIFICANT CHANGE UP (ref 0.88–1.16)
KETONES UR-MCNC: NEGATIVE — SIGNIFICANT CHANGE UP
LACTATE SERPL-SCNC: 1.2 MMOL/L — SIGNIFICANT CHANGE UP (ref 0.7–2)
LEUKOCYTE ESTERASE UR-ACNC: NEGATIVE — SIGNIFICANT CHANGE UP
LYMPHOCYTES # BLD AUTO: 2.11 K/UL — SIGNIFICANT CHANGE UP (ref 1–3.3)
LYMPHOCYTES # BLD AUTO: 29.5 % — SIGNIFICANT CHANGE UP (ref 13–44)
MCHC RBC-ENTMCNC: 32.2 PG — SIGNIFICANT CHANGE UP (ref 27–34)
MCHC RBC-ENTMCNC: 34.2 GM/DL — SIGNIFICANT CHANGE UP (ref 32–36)
MCV RBC AUTO: 94.3 FL — SIGNIFICANT CHANGE UP (ref 80–100)
MONOCYTES # BLD AUTO: 1 K/UL — HIGH (ref 0–0.9)
MONOCYTES NFR BLD AUTO: 14 % — SIGNIFICANT CHANGE UP (ref 2–14)
NEUTROPHILS # BLD AUTO: 3.82 K/UL — SIGNIFICANT CHANGE UP (ref 1.8–7.4)
NEUTROPHILS NFR BLD AUTO: 53.4 % — SIGNIFICANT CHANGE UP (ref 43–77)
NITRITE UR-MCNC: NEGATIVE — SIGNIFICANT CHANGE UP
NRBC # BLD: 0 /100 WBCS — SIGNIFICANT CHANGE UP (ref 0–0)
PH UR: 7 — SIGNIFICANT CHANGE UP (ref 5–8)
PLATELET # BLD AUTO: 342 K/UL — SIGNIFICANT CHANGE UP (ref 150–400)
POTASSIUM SERPL-MCNC: 4.2 MMOL/L — SIGNIFICANT CHANGE UP (ref 3.5–5.3)
POTASSIUM SERPL-SCNC: 4.2 MMOL/L — SIGNIFICANT CHANGE UP (ref 3.5–5.3)
PROT SERPL-MCNC: 6.5 GM/DL — SIGNIFICANT CHANGE UP (ref 6–8.3)
PROT UR-MCNC: NEGATIVE MG/DL — SIGNIFICANT CHANGE UP
PROTHROM AB SERPL-ACNC: 13.1 SEC — HIGH (ref 10–12.9)
RBC # BLD: 3.69 M/UL — LOW (ref 3.8–5.2)
RBC # FLD: 12.1 % — SIGNIFICANT CHANGE UP (ref 10.3–14.5)
RBC CASTS # UR COMP ASSIST: ABNORMAL /HPF (ref 0–4)
SODIUM SERPL-SCNC: 139 MMOL/L — SIGNIFICANT CHANGE UP (ref 135–145)
SP GR SPEC: 1.01 — SIGNIFICANT CHANGE UP (ref 1.01–1.02)
TROPONIN I SERPL-MCNC: <.015 NG/ML — SIGNIFICANT CHANGE UP (ref 0.01–0.04)
UROBILINOGEN FLD QL: NEGATIVE MG/DL — SIGNIFICANT CHANGE UP
WBC # BLD: 7.15 K/UL — SIGNIFICANT CHANGE UP (ref 3.8–10.5)
WBC # FLD AUTO: 7.15 K/UL — SIGNIFICANT CHANGE UP (ref 3.8–10.5)

## 2019-06-11 PROCEDURE — 99284 EMERGENCY DEPT VISIT MOD MDM: CPT | Mod: 25

## 2019-06-11 PROCEDURE — 70450 CT HEAD/BRAIN W/O DYE: CPT | Mod: 26

## 2019-06-11 PROCEDURE — 93010 ELECTROCARDIOGRAM REPORT: CPT

## 2019-06-11 NOTE — ED ADULT TRIAGE NOTE - CHIEF COMPLAINT QUOTE
pt c/o that she has not been feeling well x 3 weeks, worse tonight, "out of body experience", dizziness and nausea.   pt also c/o mid-sternal chest pain since 1am.   pt see PMD on friday and is currently taking abx for augmentin for sinus infection,

## 2019-06-11 NOTE — ED PROVIDER NOTE - CLINICAL SUMMARY MEDICAL DECISION MAKING FREE TEXT BOX
Pt w above dx, labs & imaging neg for acute pathology, pt not anemic, CE neg, lactate neg, CT head w mild mucosal thickening.  Discussed results and outcome of testing with the patient, given copy as well.  Patient advised to please follow up with their primary care doctor within the next 24 hours and return to the Emergency Department for worsening symptoms or any other concerns.  Patient advised that their doctor may call  to follow up on the specific results of the tests performed today in the emergency department. Pt w above dx, labs & imaging neg for acute pathology, pt not anemic, CE neg, lactate neg, CT head w mild mucosal thickening.  UA w blood, pt reports she has been spotting, HCG neg.  Discussed results and outcome of testing with the patient, given copy as well.  Patient advised to please follow up with their primary care doctor within the next 24 hours and return to the Emergency Department for worsening symptoms or any other concerns.  Patient advised that their doctor may call  to follow up on the specific results of the tests performed today in the emergency department.

## 2019-06-11 NOTE — ED ADULT NURSE NOTE - OBJECTIVE STATEMENT
Pt complains of generalized abdominal pain, "entire head feels like it's on fire" and midsternal and left sided chest pain

## 2019-06-11 NOTE — ED PROVIDER NOTE - OBJECTIVE STATEMENT
Pertinent PMH/PSH/FHx/SHx and Review of Systems contained within:    38yo F w no significant PMH presents to ED for eval of dizziness, CP & malaise.  Pt states she was feeling malaise for about 3wks w subjective fever & chills.  Pt then went to , dx with sinusitis, prescribed augmentin, ibuprofen, flonase which she has been taking without relief.  Pt then had episode of severe CP tonight.  Denies syncope, SOB, vomiting, diarrhea.  Pt recently traveled to [place] & [place], no known sick contacts.    +subjective fever/chills, No photophobia/eye pain/changes in vision, No ear pain/sore throat/dysphagia, No chest pain/palpitations, no wheeze/stridor, No abdominal pain, No neck/back pain, no rash Pertinent PMH/PSH/FHx/SHx and Review of Systems contained within:    38yo F w no significant PMH presents to ED for eval of dizziness, CP & malaise.  Pt states she was feeling malaise for about 3wks w subjective fever & chills.  Pt then went to , dx with sinusitis, prescribed augmentin, ibuprofen, flonase which she has been taking without relief.  Pt then had episode of severe CP tonight after taking her antibiotic.  Denies syncope, SOB, vomiting, diarrhea.  Pt recently traveled to [place] & [place], no known sick contacts.    +subjective fever/chills, No photophobia/eye pain/changes in vision, No ear pain/sore throat/dysphagia, No chest pain/palpitations, no wheeze/stridor, No abdominal pain, No neck/back pain, no rash

## 2019-06-11 NOTE — ED PROVIDER NOTE - PHYSICAL EXAMINATION
Gen: Alert, NAD, speaking in complete sentences  Head: NC, AT, EOMI, normal lids/conjunctiva  ENT: normal hearing, patent oropharynx, MMM  Neck: supple, no tenderness/meningismus, FROM, Trachea midline  Pulm: Bilateral clear BS, normal resp effort, no wheeze/stridor/retractions  CV: RRR, no M/R/G, +dist pulses  Abd: soft, NT/ND, +BS, no guarding/rebound tenderness  Mskel: no edema/erythema/cyanosis  Skin: no rash  Neuro: no sensory/motor deficits

## 2019-06-12 PROBLEM — D25.9 LEIOMYOMA OF UTERUS, UNSPECIFIED: Chronic | Status: ACTIVE | Noted: 2017-09-13

## 2019-06-12 LAB
CULTURE RESULTS: NO GROWTH — SIGNIFICANT CHANGE UP
SPECIMEN SOURCE: SIGNIFICANT CHANGE UP

## 2019-07-19 NOTE — H&P PST ADULT - NEGATIVE FEMALE-SPECIFIC SYMPTOMS
Looks like they left her a message to call, nothing else documented. We normally don't write orders for repeat imaging, they do what radiologist recommends.  Have her call WC no irregular menses

## 2021-09-22 NOTE — PHYSICAL THERAPY INITIAL EVALUATION ADULT - DIAGNOSIS, PT EVAL
Pt s/p exploratory laparotomy, lysis of adhesions on 09/28/2017; pt presents with L/S pain (right sided) and weak abdominal muscles, Leg Length Discrepancy (-)
Clothing

## 2023-09-01 NOTE — DISCHARGE NOTE ADULT - ABILITY TO HEAR (WITH HEARING AID OR HEARING APPLIANCE IF NORMALLY USED):
Please call parents to make that they got my message that all the labs are normal.  Nury Edmondson MD     Abdominal Pain, N/V/D Adequate: hears normal conversation without difficulty